# Patient Record
Sex: MALE | Race: WHITE | Employment: FULL TIME | URBAN - METROPOLITAN AREA
[De-identification: names, ages, dates, MRNs, and addresses within clinical notes are randomized per-mention and may not be internally consistent; named-entity substitution may affect disease eponyms.]

---

## 2019-08-24 ENCOUNTER — OFFICE VISIT (OUTPATIENT)
Dept: INTERNAL MEDICINE CLINIC | Facility: CLINIC | Age: 64
End: 2019-08-24
Payer: COMMERCIAL

## 2019-08-24 VITALS
OXYGEN SATURATION: 97 % | HEIGHT: 71 IN | DIASTOLIC BLOOD PRESSURE: 78 MMHG | SYSTOLIC BLOOD PRESSURE: 110 MMHG | HEART RATE: 71 BPM | BODY MASS INDEX: 24.87 KG/M2 | WEIGHT: 177.63 LBS | RESPIRATION RATE: 14 BRPM | TEMPERATURE: 98 F

## 2019-08-24 DIAGNOSIS — E11.9 TYPE 2 DIABETES MELLITUS WITHOUT COMPLICATION, WITHOUT LONG-TERM CURRENT USE OF INSULIN (HCC): Primary | ICD-10-CM

## 2019-08-24 DIAGNOSIS — N52.1 ERECTILE DYSFUNCTION DUE TO DIABETES MELLITUS (HCC): ICD-10-CM

## 2019-08-24 DIAGNOSIS — E78.00 HIGH CHOLESTEROL: ICD-10-CM

## 2019-08-24 DIAGNOSIS — E11.69 ERECTILE DYSFUNCTION DUE TO DIABETES MELLITUS (HCC): ICD-10-CM

## 2019-08-24 DIAGNOSIS — Z23 NEED FOR DIPHTHERIA-TETANUS-PERTUSSIS (TDAP) VACCINE: ICD-10-CM

## 2019-08-24 DIAGNOSIS — M21.371 RIGHT FOOT DROP: ICD-10-CM

## 2019-08-24 PROCEDURE — 90472 IMMUNIZATION ADMIN EACH ADD: CPT | Performed by: INTERNAL MEDICINE

## 2019-08-24 PROCEDURE — 90471 IMMUNIZATION ADMIN: CPT | Performed by: INTERNAL MEDICINE

## 2019-08-24 PROCEDURE — 99204 OFFICE O/P NEW MOD 45 MIN: CPT | Performed by: INTERNAL MEDICINE

## 2019-08-24 PROCEDURE — 90715 TDAP VACCINE 7 YRS/> IM: CPT | Performed by: INTERNAL MEDICINE

## 2019-08-24 RX ORDER — SERTRALINE HYDROCHLORIDE 100 MG/1
100 TABLET, FILM COATED ORAL NIGHTLY
COMMUNITY
End: 2020-06-05

## 2019-08-24 RX ORDER — TADALAFIL 20 MG/1
20 TABLET ORAL
COMMUNITY
End: 2021-12-30

## 2019-08-24 RX ORDER — TADALAFIL 20 MG/1
10 TABLET ORAL AS NEEDED
Qty: 24 TABLET | Refills: 3 | Status: SHIPPED | OUTPATIENT
Start: 2019-08-24

## 2019-08-24 NOTE — PROGRESS NOTES
[facility]    CHIEF COMPLAINT:  Patient presents with:  Weakness: both feet are very weak, hard to  feet, left foot worse than the right  Diabetes: due for foot exam, due for eye exam, needs blood work        HISTORY OF PRESENT ILLNESS:  T type diabetes mellitus without mention of complication, not stated as uncontrolled         Past Surgical History:   Past Surgical History:   Procedure Laterality Date   • HERNIA SURGERY  1960   • OTHER      2/3 of right lung removed in may 2000       [employer] Occupational History      Not on file    Social Needs      Financial resource strain: Not on file      Food insecurity:        Worry: Not on file        Inability: Not on file      Transportation needs:        Medical: Not on file        Non-medical: Not o disturbance. Respiratory: Negative for apnea, cough, shortness of breath and wheezing. Cardiovascular: Negative for chest pain, palpitations and leg swelling.    Gastrointestinal: Negative for heartburn, nausea, abdominal pain, diarrhea, constipation a bunion. Left foot: There is no deformity and no left foot bunion. Feet:     Right Foot:   Monofilament Exam: 8 sites tested. 8 sites sensed. Skin Integrity: Negative for ulcer, skin breakdown or erythema.    Left Foot:   Monofilament Exam: 8 site appropriate labs, including microalbumin to investigate for early evidence of diabetic nephropathy and vit b12 to check for def, which is common among patient taking metformin chronically    Patient has right foot drop and weakness distally.  He walks with

## 2019-11-06 ENCOUNTER — TELEPHONE (OUTPATIENT)
Dept: INTERNAL MEDICINE CLINIC | Facility: CLINIC | Age: 64
End: 2019-11-06

## 2019-11-06 NOTE — TELEPHONE ENCOUNTER
Called pt today to request status of labs and DM eye exam.  LVM to please follow up on Dr. Demar Salinas orders and to call the office to schedule a follow up visit. Will place a f/u call in 1 wk.

## 2019-11-15 ENCOUNTER — TELEPHONE (OUTPATIENT)
Dept: INTERNAL MEDICINE CLINIC | Facility: CLINIC | Age: 64
End: 2019-11-15

## 2020-04-07 ENCOUNTER — PATIENT MESSAGE (OUTPATIENT)
Dept: INTERNAL MEDICINE CLINIC | Facility: CLINIC | Age: 65
End: 2020-04-07

## 2020-04-07 DIAGNOSIS — E78.00 HIGH CHOLESTEROL: Primary | ICD-10-CM

## 2020-04-07 RX ORDER — ATORVASTATIN CALCIUM 40 MG/1
40 TABLET, FILM COATED ORAL DAILY
Qty: 30 TABLET | Refills: 0 | Status: SHIPPED | OUTPATIENT
Start: 2020-04-07 | End: 2020-05-07

## 2020-04-07 NOTE — TELEPHONE ENCOUNTER
Last OV relevant to medication: 8/24/19  Last refill date: Historical     #/refills: Pt reported  When pt was asked to return for OV: 4 weeks  Upcoming appt/reason: none, 9/24/29 PE cancelled    Pt has not done labs. Elke sent back to pt.   Advised to gorge

## 2020-05-07 DIAGNOSIS — E78.00 HIGH CHOLESTEROL: ICD-10-CM

## 2020-05-07 RX ORDER — ATORVASTATIN CALCIUM 40 MG/1
40 TABLET, FILM COATED ORAL DAILY
Qty: 90 TABLET | Refills: 0 | OUTPATIENT
Start: 2020-05-07

## 2020-05-07 RX ORDER — ATORVASTATIN CALCIUM 40 MG/1
40 TABLET, FILM COATED ORAL DAILY
Qty: 30 TABLET | Refills: 0 | Status: SHIPPED | OUTPATIENT
Start: 2020-05-07 | End: 2020-06-09

## 2020-05-07 NOTE — TELEPHONE ENCOUNTER
Last OV relevant to medication: 8/24/19  Last refill date: 4/7/20     #/refills: 30/0  When pt was asked to return for OV: for physical  Upcoming appt/reason: none  No labs on file. Reminded via letter 9/2019, 3/2019 and pt outreach in 11/2019.  Per email

## 2020-06-05 RX ORDER — SERTRALINE HYDROCHLORIDE 100 MG/1
100 TABLET, FILM COATED ORAL NIGHTLY
Qty: 30 TABLET | Refills: 0 | Status: SHIPPED | OUTPATIENT
Start: 2020-06-05 | End: 2020-06-26

## 2020-06-05 NOTE — TELEPHONE ENCOUNTER
Last OV relevant to medication: 8/24/19  Last refill date:pt reported  When pt was asked to return for OV: 1mo  Upcoming appt/reason: 6/11/20  Getting labs on 6/10/20

## 2020-06-09 RX ORDER — ATORVASTATIN CALCIUM 40 MG/1
40 TABLET, FILM COATED ORAL DAILY
Qty: 90 TABLET | Refills: 0 | Status: SHIPPED | OUTPATIENT
Start: 2020-06-09 | End: 2020-09-07

## 2020-06-09 NOTE — TELEPHONE ENCOUNTER
Bea Richardson calling from [facility], saying they only got a 30 day for the atorvastatin 40 MG Oral Tab, they got the script from 5/7 30 day fill with no refill.  Pharmacy requesting medication   Please Advise    Thank you   [address]

## 2020-06-10 ENCOUNTER — LAB ENCOUNTER (OUTPATIENT)
Dept: LAB | Age: 65
End: 2020-06-10
Attending: INTERNAL MEDICINE
Payer: COMMERCIAL

## 2020-06-10 ENCOUNTER — TELEPHONE (OUTPATIENT)
Dept: INTERNAL MEDICINE CLINIC | Facility: CLINIC | Age: 65
End: 2020-06-10

## 2020-06-10 ENCOUNTER — VIRTUAL PHONE E/M (OUTPATIENT)
Dept: INTERNAL MEDICINE CLINIC | Facility: CLINIC | Age: 65
End: 2020-06-10
Payer: COMMERCIAL

## 2020-06-10 DIAGNOSIS — E78.00 HIGH CHOLESTEROL: ICD-10-CM

## 2020-06-10 DIAGNOSIS — Z20.822 EXPOSURE TO COVID-19 VIRUS: ICD-10-CM

## 2020-06-10 DIAGNOSIS — E11.9 TYPE 2 DIABETES MELLITUS WITHOUT COMPLICATION, WITHOUT LONG-TERM CURRENT USE OF INSULIN (HCC): ICD-10-CM

## 2020-06-10 DIAGNOSIS — B34.9 ACUTE VIRAL SYNDROME: Primary | ICD-10-CM

## 2020-06-10 PROCEDURE — 99213 OFFICE O/P EST LOW 20 MIN: CPT | Performed by: NURSE PRACTITIONER

## 2020-06-10 PROCEDURE — 80061 LIPID PANEL: CPT

## 2020-06-10 PROCEDURE — 80053 COMPREHEN METABOLIC PANEL: CPT

## 2020-06-10 PROCEDURE — 82607 VITAMIN B-12: CPT

## 2020-06-10 PROCEDURE — 36415 COLL VENOUS BLD VENIPUNCTURE: CPT

## 2020-06-10 PROCEDURE — 83036 HEMOGLOBIN GLYCOSYLATED A1C: CPT

## 2020-06-10 PROCEDURE — 85025 COMPLETE CBC W/AUTO DIFF WBC: CPT

## 2020-06-10 NOTE — TELEPHONE ENCOUNTER
Pt had PE scheduled for tomorrow and during his travel screen he said he has chills, weakness, joint pain, muscle pain, no fever.    Pt specifically requested Latrice     Virtual/Telephone Consent    Stevie Mattson verbally consents to a Virtual/Telephone Ewa

## 2020-06-10 NOTE — PATIENT INSTRUCTIONS
Infection prevention  - proper hand hygiene is important               - cough into your arm or a tissue  - Avoid touching your mouth, nose, eyes, and face.   - Avoid contact with others if you have symptoms of an upper or lower URI  - Avoid contact with ot this condition. Home care  · If symptoms are severe, rest at home for the first 2 to 3 days. When you resume activity, don't let yourself get too tired. · Don't smoke. If you need help stopping, talk with your healthcare provider.   · Avoid being exposed along with a muffled voice   Date Last Reviewed: 6/1/2018  © 4079-6300 [employer] [place] [address]. [facility] [facility] [place] [address] [place]. All rights reserved. This information is not intended as a substitute for professional medical care.  Always follow

## 2020-06-10 NOTE — PROGRESS NOTES
Virtual Telephone Check-In    Jodi Maria verbally consents to a Virtual/Telephone Check-In visit on 06/10/20. Patient has been referred to the [facility] website at [email or web address] to review the yearly Consent to Treat document.     Patient Carolyn Pablo MetFORMIN HCl (GLUCOPHAGE) 1000 MG Oral Tab Take 1,000 mg by mouth 2 (two) times daily with meals. • glipiZIDE (GLUCOTROL) 5 MG Oral Tab Take 5 mg by mouth 2 (two) times daily.        • Fluticasone Propionate (FLONASE) 50 MCG/ACT Nasal Suspension 1 spra Stressed the importance of proper hand hygiene               - Stressed the importance of coughing into your arm or a tissue  - Avoid touching your mouth, nose, eyes, and face.   - Avoid contact with others if you have symptoms of an upper or lower URI  - A decision-making and tests are ordered as detailed in the plan of care above.

## 2020-06-11 ENCOUNTER — LAB ENCOUNTER (OUTPATIENT)
Dept: LAB | Facility: HOSPITAL | Age: 65
End: 2020-06-11
Attending: NURSE PRACTITIONER
Payer: COMMERCIAL

## 2020-06-11 ENCOUNTER — TELEMEDICINE (OUTPATIENT)
Dept: INTERNAL MEDICINE CLINIC | Facility: CLINIC | Age: 65
End: 2020-06-11
Payer: COMMERCIAL

## 2020-06-11 ENCOUNTER — TELEPHONE (OUTPATIENT)
Dept: INTERNAL MEDICINE CLINIC | Facility: CLINIC | Age: 65
End: 2020-06-11

## 2020-06-11 DIAGNOSIS — Z13.29 SCREENING FOR THYROID DISORDER: ICD-10-CM

## 2020-06-11 DIAGNOSIS — E78.00 HIGH CHOLESTEROL: ICD-10-CM

## 2020-06-11 DIAGNOSIS — G47.33 OBSTRUCTIVE SLEEP APNEA SYNDROME: ICD-10-CM

## 2020-06-11 DIAGNOSIS — E11.65 UNCONTROLLED TYPE 2 DIABETES MELLITUS WITH HYPERGLYCEMIA (HCC): Primary | ICD-10-CM

## 2020-06-11 DIAGNOSIS — Z20.822 EXPOSURE TO COVID-19 VIRUS: ICD-10-CM

## 2020-06-11 DIAGNOSIS — R79.89 ABNORMAL CBC: ICD-10-CM

## 2020-06-11 DIAGNOSIS — Z12.5 SCREENING FOR PROSTATE CANCER: ICD-10-CM

## 2020-06-11 PROCEDURE — 99213 OFFICE O/P EST LOW 20 MIN: CPT | Performed by: NURSE PRACTITIONER

## 2020-06-11 NOTE — PROGRESS NOTES
Stevie Mattson is a 62year old male. CHIEF COMPLAINT   DM visit, lab review    HPI:   The patient states he has more fatigue then yesterday. Otherwise he is about the same. He is taking tylenol as needed. Sob only with activity, no cough.      Regarding D resection, 2/3   • NAFL (nonalcoholic fatty liver)    • DILLON on CPAP 2002   • Right foot drop    • Type II or unspecified type diabetes mellitus without mention of complication, not stated as uncontrolled       Social History:  Social History    Tobacco Use A1C 14.5 (H) 06/10/2020        ASSESSMENT AND PLAN:   1. Uncontrolled type 2 diabetes mellitus with hyperglycemia ([facility].)  - The patient's labs were reviewed with him in detail. His DM is out of control. He is not adherent to a DM diet.  He states he is [place]

## 2020-06-11 NOTE — TELEPHONE ENCOUNTER
Doximity Video Visit Consent    Ladi Henry verbally consents to a Doximity Video Visit Check-In service on 6-11-20  Patient understands that we are using a different platform that may not be as secure as our traditional telehealth platform.  Patient was

## 2020-06-11 NOTE — TELEPHONE ENCOUNTER
Please check with the patient on which pharmacy he would like to use. Then order a glucose monitor with testing supplies. He should have enough strips to check his sugar TID for one month. Thanks.

## 2020-06-11 NOTE — PATIENT INSTRUCTIONS
Make appointments with the referral provided. Start with the diabetes clinic and hematology. Continue your current medications until seen by diabetes clinic, they likely will be changed at that point.     Start checking your blood sugar three times a da Take insulin or other diabetes medicine exactly as told to. · Watch your blood sugar as you are told to. Keep a log of your results. This will help your provider change your medicines to keep your blood sugar under control.   · Try to reach your ideal weig to ask if you need to change your insulin dose. This will depend on your blood sugar results. · Check your blood sugar every 2 to 4 hours, or at least 4 times a day. · Check your ketones often.  Watch them more often if you are vomiting and having diarrhe advice  Call your healthcare provider right away if you have any of these symptoms of high blood sugar that don't go away with the above treatment suggestions:  · Urinating often  · Drowsiness  · Thirst  · Headache  · Nausea or vomiting  · Belly (abdominal

## 2020-06-12 NOTE — TELEPHONE ENCOUNTER
Left detailed message on physician line at DM clinic. Advised of pt's name & . Referred yesterday & to assist in scheduling pt as soon as possible. Made aware A1C 14.5    Left detailed message on pt's mobile, per HIPAA.   Advised to call back to let u

## 2020-06-12 NOTE — TELEPHONE ENCOUNTER
The patient was called to see if he is aware of his pharmacy yet. He is going to call this morning and let the office know. He still feels about the same. He has no fever. Has HA and fatigue. He is also very thirsty and urinating frequently.  He is nega

## 2020-06-17 NOTE — TELEPHONE ENCOUNTER
I tried to call the patient to check in with him and encourage him to make the apt. There was no answer and I left a message. I will try to call him again tomorrow. Thanks.

## 2020-06-17 NOTE — TELEPHONE ENCOUNTER
[employer] from the [address] called to let Flavio Eddy know she has tried to reach patient several times and has left messages. Patient has not returned any calls.     She has an opening tomorrow, June 17th, at 2:45 and wanted to offer that to him since t

## 2020-06-18 ENCOUNTER — TELEPHONE (OUTPATIENT)
Dept: INTERNAL MEDICINE CLINIC | Facility: CLINIC | Age: 65
End: 2020-06-18

## 2020-06-18 ENCOUNTER — PATIENT MESSAGE (OUTPATIENT)
Dept: INTERNAL MEDICINE CLINIC | Facility: CLINIC | Age: 65
End: 2020-06-18

## 2020-06-18 DIAGNOSIS — E11.65 UNCONTROLLED TYPE 2 DIABETES MELLITUS WITH HYPERGLYCEMIA (HCC): Primary | ICD-10-CM

## 2020-06-18 NOTE — TELEPHONE ENCOUNTER
The patient was attempted to be called again. No answer. Voicemail left to call our office back asap. Awaiting return call.

## 2020-06-19 NOTE — TELEPHONE ENCOUNTER
From: Wellington Dowling  To: CONSUELO Schroeder  Sent: 6/18/2020 8:40 PM CDT  Subject: Other    My pharmacy is  [facility] Kavitha Song [phone]  Fax [phone]

## 2020-06-19 NOTE — TELEPHONE ENCOUNTER
Can you please order a glucose meter and supplies to the patients pharmacy. He should check his sugar TID before meals and record. Thanks.

## 2020-06-22 RX ORDER — BLOOD-GLUCOSE METER
EACH MISCELLANEOUS
Qty: 1 KIT | Refills: 0 | Status: SHIPPED | OUTPATIENT
Start: 2020-06-22

## 2020-06-22 RX ORDER — LANCETS
EACH MISCELLANEOUS
Qty: 300 EACH | Refills: 0 | Status: SHIPPED | OUTPATIENT
Start: 2020-06-22

## 2020-06-22 RX ORDER — BLOOD SUGAR DIAGNOSTIC
STRIP MISCELLANEOUS
Qty: 300 STRIP | Refills: 0 | Status: SHIPPED | OUTPATIENT
Start: 2020-06-22 | End: 2021-06-22

## 2020-06-23 NOTE — TELEPHONE ENCOUNTER
Message sent reminding the patient to make an apt with DM clinic. Josse Pavon,     Please call and schedule and apt with the diabetes clinic asap. Thanks.     Macho Díaz NP

## 2020-06-26 RX ORDER — SERTRALINE HYDROCHLORIDE 100 MG/1
TABLET, FILM COATED ORAL
Qty: 30 TABLET | Refills: 0 | Status: SHIPPED | OUTPATIENT
Start: 2020-06-26 | End: 2020-07-31

## 2020-06-26 NOTE — TELEPHONE ENCOUNTER
Please remind the patient to make an apt with DM clinic, hematology, and also an apt with our office for annual PE. Thanks.

## 2020-07-31 ENCOUNTER — PATIENT MESSAGE (OUTPATIENT)
Dept: INTERNAL MEDICINE CLINIC | Facility: CLINIC | Age: 65
End: 2020-07-31

## 2020-07-31 NOTE — TELEPHONE ENCOUNTER
LM's for patient to call back and schedule Overdue Physical to release Refill Request.  Patient has only been here in office once 8/24/2019 w/ Kateryna Lizarraga. Claudine Valdez has been handling patient thru Virtual Apts.       Last OV relevant to medication: Historical Med  L

## 2020-08-03 RX ORDER — SERTRALINE HYDROCHLORIDE 100 MG/1
100 TABLET, FILM COATED ORAL NIGHTLY
Qty: 30 TABLET | Refills: 0 | Status: SHIPPED | OUTPATIENT
Start: 2020-08-03 | End: 2020-09-14

## 2020-08-03 NOTE — TELEPHONE ENCOUNTER
I signed a 30 day supply. He has not followed up due to losing his job and insurance. Can we call him at some point and see how he is doing and see if he has insurance yet so he can be seen. Thanks.

## 2020-08-03 NOTE — TELEPHONE ENCOUNTER
From: Kodak Zapata  To: CONSUELO Boothe  Sent: 7/31/2020 5:59 PM CDT  Subject: Prescription Question    Hi  Looking to have my sertraline refilled  Was not able to come due to COVID   I did have my labs done    You and I did a virtual visit  Thank

## 2020-08-05 ENCOUNTER — TELEPHONE (OUTPATIENT)
Dept: INTERNAL MEDICINE CLINIC | Facility: CLINIC | Age: 65
End: 2020-08-05

## 2020-08-05 RX ORDER — SERTRALINE HYDROCHLORIDE 100 MG/1
100 TABLET, FILM COATED ORAL NIGHTLY
Qty: 30 TABLET | Refills: 0 | OUTPATIENT
Start: 2020-08-05

## 2020-09-09 DIAGNOSIS — E78.00 HIGH CHOLESTEROL: Primary | ICD-10-CM

## 2020-09-09 NOTE — TELEPHONE ENCOUNTER
Did the patient contact the pharmacy directly?:  Pharmacy called and stated they tried to send electronically but it didn't go through    Is patient out of meds or supply very low?:  Pharmacy didn't know    Medication Requested:  atorvastatin 40 MG Oral Ta

## 2020-09-11 NOTE — TELEPHONE ENCOUNTER
Last OV relevant to medication: 6/11/20  Last refill date: 6/9/20     #/refills: 90/0  When pt was asked to return for OV: 1 month  Upcoming appt/reason: none, reminder sent (PSRs also tried to reach pt last month.)  Lab Results   Component Value Date    G

## 2020-09-12 RX ORDER — ATORVASTATIN CALCIUM 40 MG/1
40 TABLET, FILM COATED ORAL NIGHTLY
Qty: 30 TABLET | Refills: 0 | Status: SHIPPED | OUTPATIENT
Start: 2020-09-12 | End: 2020-10-12

## 2020-09-12 NOTE — TELEPHONE ENCOUNTER
LM's for patient to call back and schedule his Physical appointment to release refill request.    Last OV relevant to medication: 6/11/2020 - Doximity  Last refill date: 8/3/2020     #/refills: 30/0  When pt was asked to return for OV: 1 month - PE  Upcomi

## 2020-09-12 NOTE — TELEPHONE ENCOUNTER
Patient lost his insurance shortly after his last visit. I sent a response to him in my chart to check on his status and encouraging him to make an appointment once he gets insurance.   He was also already notified previously of resources to go to in the m

## 2020-09-14 RX ORDER — SERTRALINE HYDROCHLORIDE 100 MG/1
TABLET, FILM COATED ORAL
Qty: 30 TABLET | Refills: 0 | OUTPATIENT
Start: 2020-09-14

## 2020-09-14 RX ORDER — SERTRALINE HYDROCHLORIDE 100 MG/1
100 TABLET, FILM COATED ORAL NIGHTLY
Qty: 30 TABLET | Refills: 0 | Status: SHIPPED | OUTPATIENT
Start: 2020-09-14 | End: 2020-11-09

## 2020-09-14 RX ORDER — GLIPIZIDE 5 MG/1
5 TABLET ORAL 2 TIMES DAILY
Qty: 60 TABLET | Refills: 0 | Status: SHIPPED | OUTPATIENT
Start: 2020-09-14

## 2020-09-14 NOTE — TELEPHONE ENCOUNTER
See pt response. He needs refills on Metformin, Sertraline, and Glipizide.      Last OV relevant to medication: 6/11/2020 - Doximity  Last refill date: 8/3/2020     #/refills: 30/0  When pt was asked to return for OV: 1 month - PE  Upcoming appt/reason: No

## 2020-09-14 NOTE — TELEPHONE ENCOUNTER
Response sent:    Josse Persaud were sent to the pharmacy.  I am glad to hear that you have new employment.  Make an appointment with me once you have insurance for your annual physical.  You also should make an appointment as soon as you have insur

## 2020-10-12 DIAGNOSIS — E78.00 HIGH CHOLESTEROL: ICD-10-CM

## 2020-10-12 NOTE — TELEPHONE ENCOUNTER
Did the patient contact the pharmacy directly?:  This was the pharmacy calling to see the status. Is patient out of meds or supply very low?:  The pharmacist Herminia Hall could not answer if he was out or not.     Medication Requested: atoratorvastatin 40 MG

## 2020-10-14 RX ORDER — ATORVASTATIN CALCIUM 40 MG/1
40 TABLET, FILM COATED ORAL NIGHTLY
Qty: 90 TABLET | Refills: 0 | Status: SHIPPED | OUTPATIENT
Start: 2020-10-14

## 2020-11-09 ENCOUNTER — TELEPHONE (OUTPATIENT)
Dept: INTERNAL MEDICINE CLINIC | Facility: CLINIC | Age: 65
End: 2020-11-09

## 2020-11-11 RX ORDER — SERTRALINE HYDROCHLORIDE 100 MG/1
100 TABLET, FILM COATED ORAL NIGHTLY
Qty: 30 TABLET | Refills: 0 | Status: SHIPPED | OUTPATIENT
Start: 2020-11-11 | End: 2020-12-03

## 2020-11-11 NOTE — TELEPHONE ENCOUNTER
Prescription signed. The patient had some insurance issues a few months ago and was not able to follow-up. His A1c was very uncontrolled and he is aware of this. Can you please call lab and check with him to see what his status is on being insured and if he can make an appointment. Thank you.

## 2020-11-11 NOTE — TELEPHONE ENCOUNTER
Last OV relevant to medication: 06/11/2020  Last refill date: 09/14/2020     #/refills: 30/0  When pt was asked to return for OV: 4 WKS  Upcoming appt/reason: Nothing Scheduled.

## 2020-11-12 NOTE — TELEPHONE ENCOUNTER
PSR - See Latrice's note below. Please call pt & check insurance status. If not an issue anymore, please schedule pt for appointment to discuss uncontrolled A1C/diabetes. Thank you!

## 2020-12-03 RX ORDER — SERTRALINE HYDROCHLORIDE 100 MG/1
TABLET, FILM COATED ORAL
Qty: 30 TABLET | Refills: 0 | Status: SHIPPED | OUTPATIENT
Start: 2020-12-03 | End: 2020-12-29

## 2020-12-03 NOTE — TELEPHONE ENCOUNTER
Last refill #30 on 11/11/2020  Last office visit pertaining to refill on 6/11/2020  On 11/9/2020 - message was left for patient to call and schedule an appointment if patient has insurance. No appointment scheduled and no call back.       No future appoint

## 2020-12-28 NOTE — TELEPHONE ENCOUNTER
[facility] to schedule [facility].     Last OV relevant to medication: 06/11/2020  Last refill date: 12/03/2020     #/refills: 30/1  When pt was asked to return for OV: 4 wks for PE  Upcoming appt/reason: Nothing Scheduled

## 2020-12-29 RX ORDER — SERTRALINE HYDROCHLORIDE 100 MG/1
100 TABLET, FILM COATED ORAL NIGHTLY
Qty: 30 TABLET | Refills: 0 | Status: SHIPPED | OUTPATIENT
Start: 2020-12-29 | End: 2021-01-30

## 2020-12-29 NOTE — TELEPHONE ENCOUNTER
Pt was also reminded at 12/3/20 refill encounter. Pt has had insurance issues recently. May be self-pay still.   Pt reviewed reminder to schedule PE.

## 2021-01-27 ENCOUNTER — TELEPHONE (OUTPATIENT)
Dept: INTERNAL MEDICINE CLINIC | Facility: CLINIC | Age: 66
End: 2021-01-27

## 2021-01-27 NOTE — TELEPHONE ENCOUNTER
LM's for patient to call back and schedule Physical appointment to release refill request.        Last OV relevant to medication: 8/24/2019 - In Person w/ Habib, 6/11/2020 - Telemed Visit - DM & Labs  Last refill date: 12/29/2020     #/refills: 30/0  When

## 2021-01-30 RX ORDER — SERTRALINE HYDROCHLORIDE 100 MG/1
TABLET, FILM COATED ORAL
Qty: 30 TABLET | Refills: 0 | Status: SHIPPED | OUTPATIENT
Start: 2021-01-30

## 2021-02-05 NOTE — TELEPHONE ENCOUNTER
Please encourage him to call the health dept for resources for health care in the meantime. He really needs care on the DM and other issues. Thanks. Statement Selected

## 2021-02-24 RX ORDER — SERTRALINE HYDROCHLORIDE 100 MG/1
TABLET, FILM COATED ORAL
Qty: 30 TABLET | Refills: 0 | OUTPATIENT
Start: 2021-02-24

## 2021-02-24 NOTE — TELEPHONE ENCOUNTER
Refill denied. Pt has been contacted several times by multiple office members including CONSUELO. Pt scheduled an canceled an appointment on 11/12/2020.       Last OV relevant to medication: 06/11/2020  Last refill date: 01/30/2021     #/refills: 30/0  When pt

## 2021-03-20 DIAGNOSIS — Z23 NEED FOR VACCINATION: ICD-10-CM

## 2022-04-26 ENCOUNTER — PATIENT OUTREACH (OUTPATIENT)
Dept: INTERNAL MEDICINE CLINIC | Facility: CLINIC | Age: 67
End: 2022-04-26

## (undated) NOTE — LETTER
09/23/19        [address] [facility] [address]      Dear Freeman Haq,    [facility] records indicate that you have outstanding lab work and or testing that was ordered for you and has not yet been completed:  Orders Placed This Encounter      Kendy Bingham

## (undated) NOTE — LETTER
5/5/2022      [facility] Song    6/23/1955      Dear Albert Rowe,      After careful review of your medical record it has come to our attention that you are Overdue for an Office Visit and Lab Work. We take each of our patient's health very seriously and the key to maintaining your health is to routinely follow-up as planned with your providers. Please contact our office as soon as possible to schedule an appointment at [phone]. If you have established care with another provider please contact our office to be removed from our system.          Thank you,      [address] [place] [address]  Phone: [phone]  Fax: [phone]

## (undated) NOTE — LETTER
07/13/20        Benton Diana  [address] [facility] [address]      Dear Marlys Cervantes,    [facility] records indicate that you have outstanding lab work and or testing that was ordered for you and has not yet been completed:        TSH W Reflex To Free T4

## (undated) NOTE — LETTER
8/24/2019    Dear Dr. Bernadette Villegas would like to refer you Elayne Brennan. Referring Provider: Archana Kaur MD    Fax: [phone]    As soon as the patient is seen please complete the form below and fax to the referring provider without a cover sheet.

## (undated) NOTE — LETTER
To Whom It May Concern: This certifies that Armani Lynch is under my care. Please excuse him from work from 6/11/20 -6/12/20. He able to return to work on Monday June 15th without any restrictions.   Do not hesitate to call with any questions or concerns